# Patient Record
Sex: FEMALE | Race: WHITE | URBAN - METROPOLITAN AREA
[De-identification: names, ages, dates, MRNs, and addresses within clinical notes are randomized per-mention and may not be internally consistent; named-entity substitution may affect disease eponyms.]

---

## 2018-10-12 ENCOUNTER — OFFICE VISIT (OUTPATIENT)
Dept: INTERNAL MEDICINE CLINIC | Age: 23
End: 2018-10-12

## 2018-10-12 ENCOUNTER — DOCUMENTATION ONLY (OUTPATIENT)
Dept: INTERNAL MEDICINE CLINIC | Age: 23
End: 2018-10-12

## 2018-10-12 VITALS
WEIGHT: 193 LBS | SYSTOLIC BLOOD PRESSURE: 128 MMHG | HEIGHT: 64 IN | OXYGEN SATURATION: 98 % | TEMPERATURE: 99.1 F | HEART RATE: 94 BPM | RESPIRATION RATE: 22 BRPM | DIASTOLIC BLOOD PRESSURE: 73 MMHG | BODY MASS INDEX: 32.95 KG/M2

## 2018-10-12 DIAGNOSIS — F98.8 ATTENTION DEFICIT DISORDER, UNSPECIFIED HYPERACTIVITY PRESENCE: ICD-10-CM

## 2018-10-12 DIAGNOSIS — F41.9 ANXIETY: ICD-10-CM

## 2018-10-12 DIAGNOSIS — Z23 ENCOUNTER FOR IMMUNIZATION: ICD-10-CM

## 2018-10-12 RX ORDER — ESCITALOPRAM OXALATE 20 MG/1
30 TABLET ORAL DAILY
Refills: 2 | COMMUNITY
Start: 2018-09-28 | End: 2019-12-02 | Stop reason: SDUPTHER

## 2018-10-12 RX ORDER — NORETHINDRONE ACETATE AND ETHINYL ESTRADIOL AND FERROUS FUMARATE 1MG-20(24)
KIT ORAL
Refills: 0 | COMMUNITY
Start: 2018-08-13 | End: 2018-11-16 | Stop reason: SDUPTHER

## 2018-10-12 RX ORDER — ALPRAZOLAM 0.5 MG/1
TABLET ORAL
Refills: 1 | COMMUNITY
Start: 2018-08-10

## 2018-10-12 RX ORDER — DEXTROAMPHETAMINE SULFATE, DEXTROAMPHETAMINE SACCHARATE, AMPHETAMINE SULFATE AND AMPHETAMINE ASPARTATE 5; 5; 5; 5 MG/1; MG/1; MG/1; MG/1
CAPSULE, EXTENDED RELEASE ORAL
Refills: 0 | COMMUNITY
Start: 2018-10-06 | End: 2019-12-02 | Stop reason: SDUPTHER

## 2018-10-12 NOTE — PROGRESS NOTES
ROOM # 2 Identified pt with two pt identifiers(name and ). Reviewed record in preparation for visit and have obtained necessary documentation. Chief Complaint Patient presents with  New Patient Trung Vargas preferred language for health care discussion is english/other. Is the patient using any DME equipment during OV? NO Trung Vargas is due for: 
Health Maintenance Due Topic  HPV Age 9Y-34Y (1 of 3 - Female 3 Dose Series)  DTaP/Tdap/Td series (1 - Tdap)  PAP AKA CERVICAL CYTOLOGY  Influenza Age 5 to Adult Health Maintenance reviewed and discussed per provider Please order/place referral if appropriate. Advance Directive: 1. Do you have an advance directive in place? Patient Reply: NO 
 
2. If not, would you like material regarding how to put one in place? NO Coordination of Care: 1. Have you been to the ER, urgent care clinic since your last visit? Hospitalized since your last visit? NO 
 
2. Have you seen or consulted any other health care providers outside of the [address] [place] [address] since your last visit? Include any pap smears or colon screening. NO Patient is accompanied by self I have received verbal consent from Trung Vargas to discuss any/all medical information while they are present in the room. Learning Assessment: 
No flowsheet data found. Depression Screening: PHQ over the last two weeks 10/12/2018 Little interest or pleasure in doing things Not at all Feeling down, depressed, irritable, or hopeless Not at all Total Score PHQ 2 0 Abuse Screening: No flowsheet data found. Fall Risk No flowsheet data found.

## 2018-10-12 NOTE — MR AVS SNAPSHOT
[facility] 
 
 
 [address] [facility] [address] [address] 
[phone] Patient: Anette Stewart MRN: [id number] :1995 Visit Information Date & Time Provider Department Dept. Phone Encounter #  
 10/12/2018  9:30 AM [address] [phone] [id number] Follow-up Instructions Return if symptoms worsen or fail to improve. Upcoming Health Maintenance Date Due  
 HPV Age 9Y-34Y (1 of 3 - Female 3 Dose Series) 2008 DTaP/Tdap/Td series (1 - Tdap) 2018 PAP AKA CERVICAL CYTOLOGY 2018 Influenza Age 5 to Adult 2018 Allergies as of 10/12/2018  Review Complete On: 10/12/2018 By: Elroy Quintanilla MD  
  
 Severity Noted Reaction Type Reactions Erythromycin  10/12/2018    Swelling Current Immunizations  Never Reviewed No immunizations on file. Not reviewed this visit You Were Diagnosed With   
  
 Codes Comments BMI 33.0-33.9,adult    -  Primary ICD-10-CM: L33.18 
ICD-9-CM: V85.33 Anxiety     ICD-10-CM: F41.9 ICD-9-CM: 300.00 Attention deficit disorder, unspecified hyperactivity presence     ICD-10-CM: F98.8 ICD-9-CM: 314.00 Vitals BP Pulse Temp Resp Height(growth percentile) Weight(growth percentile) 128/73 (BP 1 Location: Right arm, BP Patient Position: Sitting) 94 99.1 °F (37.3 °C) (Oral) 22 5' 4\" (1.626 m) 193 lb (87.5 kg) LMP SpO2 BMI OB Status Smoking Status 10/07/2018 98% 33.13 kg/m2 Having regular periods Never Smoker BMI and BSA Data Body Mass Index Body Surface Area  
 33.13 kg/m 2 1.99 m 2 Preferred Pharmacy Pharmacy Name Phone [id number], [address] [place] [address] [phone] Your Updated Medication List  
  
   
This list is accurate as of 10/12/18 10:12 AM.  Always use your most recent med list.  
  
  
  
  
 ADDERALL XR 15 mg XR capsule Generic drug:  amphetamine-dextroamphetamine XR  
TAKE ONE CAPSULE BY MOUTH EVERY MORNING  
  
 ALPRAZolam 0.5 mg tablet Commonly known as:  XANAX  
TALE 1 TABLET BY MOUTH EVERY DAY  
  
 escitalopram oxalate 20 mg tablet Commonly known as:  Madan Boast TAKE 1 TABLET BY MOUTH EVERY DAY IN THE MORNING  
  
 MAGNESIUM PO Take  by mouth. MELODETTA 24 FE 1 mg-20 mcg(24) /75 mg (4) Chew Generic drug:  norethindrone-e.estradiol-iron TAKE 1 TABLET BY MOUTH EVERY DAY We Performed the Following REFERRAL TO WEIGHT LOSS [DHZ132 Custom] Comments:  
 Please evaluate pt with difficulty with weight loss despite dietary changes Follow-up Instructions Return if symptoms worsen or fail to improve. Referral Information Referral ID Referred By Referred To  
  
 [id number] [address] [place] [address] [address] Phone: [phone] Fax: [phone] Visits Status Start Date End Date 1 New Request 10/12/18 10/12/19 If your referral has a status of pending review or denied, additional information will be sent to support the outcome of this decision. Patient Instructions A referral has been entered to weight management. You can consider looking into an online program such as Lean Body Coaching. Try to keep food choices to 20% of calories or less from fat (grams of fat x 9, divide by total calories) Choose foods the way they are found in nature rather than processed. Body Mass Index: Care Instructions Your Care Instructions Body mass index (BMI) can help you see if your weight is raising your risk for health problems. It uses a formula to compare how much you weigh with how tall you are. · A BMI lower than 18.5 is considered underweight. · A BMI between 18.5 and 24.9 is considered healthy. · A BMI between 25 and 29.9 is considered overweight.  A BMI of 30 or higher is considered obese. If your BMI is in the normal range, it means that you have a lower risk for weight-related health problems. If your BMI is in the overweight or obese range, you may be at increased risk for weight-related health problems, such as high blood pressure, heart disease, stroke, arthritis or joint pain, and diabetes. If your BMI is in the underweight range, you may be at increased risk for health problems such as fatigue, lower protection (immunity) against illness, muscle loss, bone loss, hair loss, and hormone problems. BMI is just one measure of your risk for weight-related health problems. You may be at higher risk for health problems if you are not active, you eat an unhealthy diet, or you drink too much alcohol or use tobacco products. Follow-up care is a key part of your treatment and safety. Be sure to make and go to all appointments, and call your doctor if you are having problems. It's also a good idea to know your test results and keep a list of the medicines you take. How can you care for yourself at home? · Practice healthy eating habits. This includes eating plenty of fruits, vegetables, whole grains, lean protein, and low-fat dairy. · If your doctor recommends it, get more exercise. Walking is a good choice. Bit by bit, increase the amount you walk every day. Try for at least 30 minutes on most days of the week. · Do not smoke. Smoking can increase your risk for health problems. If you need help quitting, talk to your doctor about stop-smoking programs and medicines. These can increase your chances of quitting for good. · Limit alcohol to 2 drinks a day for men and 1 drink a day for women. Too much alcohol can cause health problems. If you have a BMI higher than 25 · Your doctor may do other tests to check your risk for weight-related health problems.  This may include measuring the distance around your waist. A waist measurement of more than 40 inches in men or 35 inches in women can increase the risk of weight-related health problems. · Talk with your doctor about steps you can take to stay healthy or improve your health. You may need to make lifestyle changes to lose weight and stay healthy, such as changing your diet and getting regular exercise. If you have a BMI lower than 18.5 · Your doctor may do other tests to check your risk for health problems. · Talk with your doctor about steps you can take to stay healthy or improve your health. You may need to make lifestyle changes to gain or maintain weight and stay healthy, such as getting more healthy foods in your diet and doing exercises to build muscle. Where can you learn more? Go to http://jeanine-mimi.info/. Enter S176 in the search box to learn more about \"Body Mass Index: Care Instructions. \" Current as of: June 26, 2018 Content Version: 11.8 © 9025-2009 Broadcast[employer]. Care instructions adapted under license by [employer] (which disclaims liability or warranty for this information). If you have questions about a medical condition or this instruction, always ask your healthcare professional. [place] [address] any warranty or liability for your use of this information. Introducing [facility] & HE[facility]! [employer] introduces iAgree patient portal. Now you can access parts of your medical record, email your doctor's office, and request medication refills online. 1. In your internet browser, go to [email or web address] 2. Click on the First Time User? Click Here link in the Sign In box. You will see the New Member Sign Up page. 3. Enter your iAgree Access Code exactly as it appears below. You will not need to use this code after youve completed the sign-up process. If you do not sign up before the expiration date, you must request a new code. · XIPWIRE Access Code: [email or web address] Expires: 1/10/2019 10:12 AM 
 
4. Enter the last four digits of your Social Security Number (xxxx) and Date of Birth (mm/dd/yyyy) as indicated and click Submit. You will be taken to the next sign-up page. 5. Create a XIPWIRE ID. This will be your XIPWIRE login ID and cannot be changed, so think of one that is secure and easy to remember. 6. Create a XIPWIRE password. You can change your password at any time. 7. Enter your Password Reset Question and Answer. This can be used at a later time if you forget your password. 8. Enter your e-mail address. You will receive e-mail notification when new information is available in [address] 9. Click Sign Up. You can now view and download portions of your medical record. 10. Click the Download Summary menu link to download a portable copy of your medical information. If you have questions, please visit the Frequently Asked Questions section of the XIPWIRE website. Remember, XIPWIRE is NOT to be used for urgent needs. For medical emergencies, dial 911. Now available from your iPhone and Android! Please provide this summary of care documentation to your next provider. If you have any questions after today's visit, please call [phone].

## 2018-10-12 NOTE — PROGRESS NOTES
Flu vaccine first dose administered in Lt deltoid with patient's consent. Patient observed for 10 minutes, no reaction noted at present time. Patient tolerated procedure well and left without any complaints. Patient received flu VIS sheet and verbalized understanding.

## 2018-10-12 NOTE — PROGRESS NOTES
HISTORY OF PRESENT ILLNESS Allen Jacobsen is a 26 y.o. female. HPI Comments: 25 yo female here to establish care, evaluation of weight. She reports she has difficulty with weight loss despite healthy diet, exercise. Reports she was a 'pudgy\" child; lost weight around age 17 then steadily regained. Notes the only times she has lost weight is when she has stopped eating. She feels she could have potential for eating disorder such as bulimia but she is unable to make herself vomit. Has been evaluated with labs in past (2016). She feels she has low metabolism. She is followed by psychiatrist and psychologist regularly. Treated for depression, anxiety, ADD. Lost some weight when first started on Adderall. Gained weight when she was on Abilify. 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 Graduated from [employer] with degree in Biology. Hopes to go back to school for master's eventually. She is due for pap. Has schedule appts with gyn for this but has not had done yet due to anxiety. Review of Systems Constitutional: Negative for chills, fever and weight loss. HENT: Negative for congestion and ear pain. Eyes: Negative for blurred vision and pain. Respiratory: Negative for cough and shortness of breath. Cardiovascular: Negative for chest pain, palpitations and leg swelling. Gastrointestinal: Negative for nausea and vomiting. Genitourinary: Negative for frequency and urgency. Musculoskeletal: Negative for joint pain and myalgias. Skin: Negative for itching and rash. Neurological: Negative for dizziness, tingling and headaches. Psychiatric/Behavioral: Positive for depression. The patient is nervous/anxious. Past Medical History:  
Diagnosis Date  ADD (attention deficit disorder)  Depression Past Surgical History:  
Procedure Laterality Date  HX HEENT No current outpatient prescriptions on file prior to visit. No current facility-administered medications on file prior to visit. Allergies Allergen Reactions  Erythromycin Swelling Family History Problem Relation Age of Onset  Cancer Mother  Heart Disease Father  Psychiatric Disorder Father  Psychiatric Disorder Brother Social History Social History  Marital status: SINGLE Spouse name: N/A  
 Number of children: N/A  
 Years of education: N/A Occupational History  Not on file. Social History Main Topics  Smoking status: Never Smoker  Smokeless tobacco: Never Used  Alcohol use Yes Comment: socially/weekends  Drug use: No  
 Sexual activity: Yes  
  Partners: Male Birth control/ protection: Pill Other Topics Concern  Not on file Social History Narrative  No narrative on file Physical Exam  
Constitutional: She appears well-developed and well-nourished. No distress. /73 (BP 1 Location: Right arm, BP Patient Position: Sitting)  Pulse 94  Temp 99.1 °F (37.3 °C) (Oral)   Resp 22  Ht 5' 4\" (1.626 m)  Wt 193 lb (87.5 kg)  LMP 10/07/2018  SpO2 98%  BMI 33.13 kg/m2 HENT:  
Right Ear: Tympanic membrane and ear canal normal.  
Left Ear: Tympanic membrane and ear canal normal.  
Mouth/Throat: No oropharyngeal exudate or posterior oropharyngeal erythema. Eyes: EOM are normal. Right eye exhibits no discharge. Left eye exhibits no discharge. No scleral icterus. Neck: Neck supple. Cardiovascular: Normal rate, regular rhythm and normal heart sounds. Exam reveals no gallop and no friction rub. No murmur heard. Pulmonary/Chest: Effort normal and breath sounds normal. No respiratory distress. She has no wheezes. She has no rales. Abdominal: Soft. She exhibits no distension. There is no tenderness. There is no rebound. Musculoskeletal: She exhibits no edema or tenderness. Lymphadenopathy:  
  She has no cervical adenopathy. Neurological: She is alert. She exhibits normal muscle tone. Skin: Skin is warm and dry. Psychiatric: Her speech is normal and behavior is normal.  
 
No results found for: TSH, TSHEXT, TSH2, TSH3, TSHP, TSHELE, TT3, T3U, T3UP, FRT3, FT3, T3T, FT4, FT4P, T4, T4P, FT4T, TT7, V0LTBJFTK, TSHEXT [facility] labs 6/14/16: TSH 1.91, T4 1.1, T3 KRISTIN 154;Chol 162, , HDL 52, LDL 79; A1c 5.1%, creatinine 0.7; hgb 13.6, hct 41.1 ASSESSMENT and PLAN 
  ICD-10-CM ICD-9-CM 1. BMI 33.0-33.9,adult Z68.33 V85.33 REFERRAL TO WEIGHT LOSS 2. Anxiety F41.9 300.00   
3. Attention deficit disorder, unspecified hyperactivity presence F98.8 314.00   
4. Encounter for immunization Z23 V03.89 INFLUENZA VIRUS VAC QUAD,SPLIT,PRESV FREE SYRINGE IM Old records requested Discussed diet which she feels is already healthy. Referral entered to weight loss clinic. Continue with exercise. Continue f/u with mental health for treatment of ADD, depression/anxiety. Flu shot today.

## 2018-10-12 NOTE — PATIENT INSTRUCTIONS
A referral has been entered to weight management. You can consider looking into an online program such as Lean Body Coaching. Try to keep food choices to 20% of calories or less from fat (grams of fat x 9, divide by total calories) Choose foods the way they are found in nature rather than processed. Body Mass Index: Care Instructions Your Care Instructions Body mass index (BMI) can help you see if your weight is raising your risk for health problems. It uses a formula to compare how much you weigh with how tall you are. · A BMI lower than 18.5 is considered underweight. · A BMI between 18.5 and 24.9 is considered healthy. · A BMI between 25 and 29.9 is considered overweight. A BMI of 30 or higher is considered obese. If your BMI is in the normal range, it means that you have a lower risk for weight-related health problems. If your BMI is in the overweight or obese range, you may be at increased risk for weight-related health problems, such as high blood pressure, heart disease, stroke, arthritis or joint pain, and diabetes. If your BMI is in the underweight range, you may be at increased risk for health problems such as fatigue, lower protection (immunity) against illness, muscle loss, bone loss, hair loss, and hormone problems. BMI is just one measure of your risk for weight-related health problems. You may be at higher risk for health problems if you are not active, you eat an unhealthy diet, or you drink too much alcohol or use tobacco products. Follow-up care is a key part of your treatment and safety. Be sure to make and go to all appointments, and call your doctor if you are having problems. It's also a good idea to know your test results and keep a list of the medicines you take. How can you care for yourself at home? · Practice healthy eating habits. This includes eating plenty of fruits, vegetables, whole grains, lean protein, and low-fat dairy. · If your doctor recommends it, get more exercise. Walking is a good choice. Bit by bit, increase the amount you walk every day. Try for at least 30 minutes on most days of the week. · Do not smoke. Smoking can increase your risk for health problems. If you need help quitting, talk to your doctor about stop-smoking programs and medicines. These can increase your chances of quitting for good. · Limit alcohol to 2 drinks a day for men and 1 drink a day for women. Too much alcohol can cause health problems. If you have a BMI higher than 25 · Your doctor may do other tests to check your risk for weight-related health problems. This may include measuring the distance around your waist. A waist measurement of more than 40 inches in men or 35 inches in women can increase the risk of weight-related health problems. · Talk with your doctor about steps you can take to stay healthy or improve your health. You may need to make lifestyle changes to lose weight and stay healthy, such as changing your diet and getting regular exercise. If you have a BMI lower than 18.5 · Your doctor may do other tests to check your risk for health problems. · Talk with your doctor about steps you can take to stay healthy or improve your health. You may need to make lifestyle changes to gain or maintain weight and stay healthy, such as getting more healthy foods in your diet and doing exercises to build muscle. Where can you learn more? Go to http://jeanine-mimi.info/. Enter S176 in the search box to learn more about \"Body Mass Index: Care Instructions. \" Current as of: June 26, 2018 Content Version: 11.8 © 2618-4006 Healthwise, Inc[employer]. Care instructions adapted under license by [employer] (which disclaims liability or warranty for this information).  If you have questions about a medical condition or this instruction, always ask your healthcare professional. [employer] disclaims any warranty or liability for your use of this information.

## 2018-11-16 ENCOUNTER — OFFICE VISIT (OUTPATIENT)
Dept: INTERNAL MEDICINE CLINIC | Age: 23
End: 2018-11-16

## 2018-11-16 VITALS
DIASTOLIC BLOOD PRESSURE: 76 MMHG | BODY MASS INDEX: 32.1 KG/M2 | TEMPERATURE: 98.8 F | WEIGHT: 188 LBS | HEIGHT: 64 IN | HEART RATE: 102 BPM | OXYGEN SATURATION: 98 % | SYSTOLIC BLOOD PRESSURE: 127 MMHG | RESPIRATION RATE: 16 BRPM

## 2018-11-16 DIAGNOSIS — N92.6 IRREGULAR MENSTRUAL CYCLE: ICD-10-CM

## 2018-11-16 DIAGNOSIS — F41.9 ANXIETY: Primary | ICD-10-CM

## 2018-11-16 RX ORDER — PROMETHAZINE HYDROCHLORIDE 25 MG/1
25 TABLET ORAL
COMMUNITY
Start: 2017-12-28 | End: 2019-06-26

## 2018-11-16 RX ORDER — NORETHINDRONE ACETATE AND ETHINYL ESTRADIOL AND FERROUS FUMARATE 1MG-20(24)
KIT ORAL
Qty: 3 DOSE PACK | Refills: 3 | Status: SHIPPED | OUTPATIENT
Start: 2018-11-16 | End: 2019-10-09 | Stop reason: SDUPTHER

## 2018-11-16 RX ORDER — HYDROCODONE BITARTRATE AND ACETAMINOPHEN 5; 300 MG/1; MG/1
TABLET ORAL
COMMUNITY
Start: 2017-12-28 | End: 2019-06-26

## 2018-11-16 NOTE — PROGRESS NOTES
ROOM # 17 Augusto Jiang presents today for Chief Complaint Patient presents with  Physical  
 Well Woman Augusto Jiang preferred language for health care discussion is english/other. Is someone accompanying this pt? Yes mother Is the patient using any DME equipment during OV? No 
 
Depression Screening: PHQ over the last two weeks 11/16/2018 10/12/2018 Little interest or pleasure in doing things Several days Not at all Feeling down, depressed, irritable, or hopeless Several days Not at all Total Score PHQ 2 2 0 Learning Assessment: 
Learning Assessment 11/16/2018 PRIMARY LEARNER Patient HIGHEST LEVEL OF EDUCATION - PRIMARY LEARNER  4 YEARS OF COLLEGE  
BARRIERS PRIMARY LEARNER NONE  
CO-LEARNER CAREGIVER No  
PRIMARY LANGUAGE ENGLISH  NEED No  
LEARNER PREFERENCE PRIMARY DEMONSTRATION  
LEARNING SPECIAL TOPICS no  
ANSWERED BY patient RELATIONSHIP SELF  
ASSESSMENT COMMENT none Abuse Screening: N/I Fall Risk N/I Health Maintenance reviewed and discussed per provider. Yes Augusto Jiang is due for Health Maintenance Due Topic Date Due  
 HPV Age 9Y-34Y (1 - Female 3-dose series) 06/17/2008  DTaP/Tdap/Td series (1 - Tdap) 06/17/2018  PAP AKA CERVICAL CYTOLOGY  06/17/2018 Please order/place referral if appropriate. Advance Directive: 1. Do you have an advance directive in place? Patient Reply: No 
 
2. If not, would you like material regarding how to put one in place? Patient Reply: No 
 
Coordination of Care: 1. Have you been to the ER, urgent care clinic since your last visit? Hospitalized since your last visit? No 
 
2. Have you seen or consulted any other health care providers outside of the [facility] since your last visit? Include any pap smears or colon screening. Yes Dr Silver Spencer

## 2018-11-16 NOTE — PATIENT INSTRUCTIONS
Please check with your insurance to see which gynecologists are in-network. Options: Dr. Segundo Smith, Dr. Colleen Dno, Dr. Zhang Sargent, Dr. Hernandez Comp Learning About Birth Control: The Implant What is the implant? The implant is used to prevent pregnancy. It's a thin myla about the size of a matchstick that is inserted under the skin (subdermal) on the inside of your arm. The implant releases the hormone progestin to prevent pregnancy. Progestin prevents pregnancy in these ways: It thickens the mucus in the cervix. This makes it hard for sperm to travel into the uterus. It also thins the lining of the uterus, which makes it harder for a fertilized egg to attach to the uterus. Progestin can sometimes stop the ovaries from releasing an egg each month (ovulation). The implant prevents pregnancy for 3 years. Once it is put in, you don't have to do anything else to prevent pregnancy. The implant can only be inserted and removed by your doctor or another trained health professional. These procedures can be done in your doctor's office and only take a few minutes. Your doctor numbs the area and \"injects\" the implant under your skin. No cuts are made in your skin. To remove the implant, your doctor numbs the area, makes a small cut in the skin, and pulls the implant out. How well does it work? The implant works very well. Fewer than 1 woman out of 100 has an unplanned pregnancy. Be sure to tell your doctor about any health problems you have or medicines you take. He or she can help you choose the birth control method that is right for you. What are the advantages of the implant? · The implant is one of the most effective methods of birth control. · It prevents pregnancy for up to 3 years. You don't have to worry about birth control for this time. · It's safe to use while breastfeeding. · The implant doesn't contain estrogen.  So you can use it if you don't want to take estrogen or can't take estrogen because you have certain health problems or concerns. · It may reduce heavy bleeding and cramping. · It's convenient. It is always providing birth control and cannot be seen. You don't need to remember to take a pill or get a shot. You don't have to interrupt sex to protect against pregnancy. What are the disadvantages of the implant? · The implant doesn't protect against sexually transmitted infections (STIs), such as herpes or HIV/AIDS. If you aren't sure if your sex partner might have an STI, use a condom to protect against infection. · It may cause irregular periods, or you may have spotting between periods. You may also stop getting a period. Some women see having no period as an advantage. · It may cause mood changes, less interest in sex, or weight gain. · You have to see a doctor to have an implant inserted and removed. Where can you learn more? Go to http://jeanine-mimi.info/. Enter F276 in the search box to learn more about \"Learning About Birth Control: The Implant. \" Current as of: November 21, 2017 Content Version: 11.8 © 0327-8619 [employer]. Care instructions adapted under license by [employer] (which disclaims liability or warranty for this information). If you have questions about a medical condition or this instruction, always ask your healthcare professional. [address] any warranty or liability for your use of this information.

## 2018-11-16 NOTE — PROGRESS NOTES
HISTORY OF PRESENT ILLNESS David Bae is a 26 y.o. female. 27 yo female here for pap but has significant anxiety/fear of this. Has not had pelvic exam in the past. Is sexually active. Has had two lifetime partners. Mother recalls that pt did receive HPV vaccine series. No pelvic complaints other than some discomfort at beginning of intercourse, otherwise pleasurable. She has been taking same OCP since age 17 which was started due to irregular menses, prolonged bleeding. Periods are regular on this. She is interested in alternatives as she sometimes forgets to take pills and does not desire pregnancy. Depo tried in past but had breakthrough bleeding. Interested in [id number] [place].. Has not schedule appt yet with weight loss clinic. Has lost 5 lbs in past month which she attributes to being busier at work. Physical  
Pertinent negatives include no chest pain, no headaches and no shortness of breath. Well Woman Pertinent negatives include no chest pain, no headaches and no shortness of breath. Review of Systems Constitutional: Negative for chills, fever and weight loss. HENT: Negative for congestion and ear pain. Eyes: Negative for blurred vision and pain. Respiratory: Negative for cough and shortness of breath. Cardiovascular: Negative for chest pain, palpitations and leg swelling. Gastrointestinal: Negative for nausea and vomiting. Genitourinary: Negative for frequency and urgency. Musculoskeletal: Negative for joint pain and myalgias. Skin: Negative for itching and rash. Neurological: Negative for dizziness, tingling and headaches. Psychiatric/Behavioral: Negative for depression. The patient is not nervous/anxious. Past Medical History:  
Diagnosis Date  ADD (attention deficit disorder)  Depression Current Outpatient Medications on File Prior to Visit Medication Sig Dispense Refill  promethazine (PHENERGAN) 25 mg tablet 25 mg.  HYDROcodone-acetaminophen (XODOL) 5-300 mg tablet Take 1-2 Tabs by Mouth Every 4 Hours As Needed for Pain.  ALPRAZolam (XANAX) 0.5 mg tablet TALE 1 TABLET BY MOUTH EVERY DAY  1  
 ADDERALL XR 15 mg XR capsule TAKE ONE CAPSULE BY MOUTH EVERY MORNING  0  
 escitalopram oxalate (LEXAPRO) 20 mg tablet TAKE 1 TABLET BY MOUTH EVERY DAY IN THE MORNING  2  
 MAGNESIUM PO Take  by mouth. No current facility-administered medications on file prior to visit. Physical Exam  
Constitutional: She appears well-developed and well-nourished. Pulmonary/Chest: Effort normal. No respiratory distress. Neurological: She is alert. Psychiatric: Her speech is normal. Thought content normal. Her mood appears anxious. Vitals reviewed. No results found for: NA, K, CL, CO2, AGAP, GLU, BUN, CREA, BUCR, GFRAA, GFRNA, CA, TBIL, TBILI, GPT, SGOT, AP, TP, ALB, GLOB, AGRAT, ALT  
 
ASSESSMENT and PLAN 
  ICD-10-CM ICD-9-CM 1. Anxiety F41.9 300.00   
2. Irregular menstrual cycle N92.6 626.4 3. BMI 32.0-32.9,adult Z68.32 V85.32 Discussed her anxiety/fear of pap at length. Despite taking xanax x 2 today, she does not feel ready for this. Will hold at this time. Discussed birth control options. She will check with her insurance for preferred gynecologist and referral can be entered. Continue with weight loss efforts. > 50% of 20 minute visit spent counseling.

## 2019-06-26 ENCOUNTER — OFFICE VISIT (OUTPATIENT)
Dept: INTERNAL MEDICINE CLINIC | Age: 24
End: 2019-06-26

## 2019-06-26 VITALS
OXYGEN SATURATION: 99 % | HEART RATE: 112 BPM | BODY MASS INDEX: 30.56 KG/M2 | TEMPERATURE: 99.6 F | WEIGHT: 179 LBS | RESPIRATION RATE: 18 BRPM | DIASTOLIC BLOOD PRESSURE: 79 MMHG | SYSTOLIC BLOOD PRESSURE: 149 MMHG | HEIGHT: 64 IN

## 2019-06-26 DIAGNOSIS — L92.3 TATTOO REACTION: Primary | ICD-10-CM

## 2019-06-26 RX ORDER — CEPHALEXIN 500 MG/1
500 CAPSULE ORAL 4 TIMES DAILY
COMMUNITY
End: 2019-12-02 | Stop reason: ALTCHOICE

## 2019-06-26 NOTE — PROGRESS NOTES
HISTORY OF PRESENT ILLNESS  Jose Eng is a 27 y.o. female. Here for f/u from urgent care visit 3 days ago due to concern with infection at new tattoo. Reports tattoo done Jun 17. Though healing well but started having drainage, irritation at site. Seen by urgent care and started on abx. Still feels raw. Some bleeding. No fevers, chills. Review of Systems   Constitutional: Negative for chills, fever and weight loss. HENT: Negative for congestion and ear pain. Eyes: Negative for blurred vision and pain. Respiratory: Negative for cough and shortness of breath. Cardiovascular: Negative for chest pain, palpitations and leg swelling. Gastrointestinal: Negative for nausea and vomiting. Genitourinary: Negative for frequency and urgency. Musculoskeletal: Negative for joint pain and myalgias. Skin: Negative for itching and rash. Neurological: Negative for dizziness, tingling and headaches. Psychiatric/Behavioral: Negative for depression. The patient is not nervous/anxious. Past Medical History:   Diagnosis Date    ADD (attention deficit disorder)     Depression      Current Outpatient Medications on File Prior to Visit   Medication Sig Dispense Refill    bupropion HCl (WELLBUTRIN PO) Take  by mouth.  cephALEXin (KEFLEX) 500 mg capsule Take 500 mg by mouth four (4) times daily.  MELODETTA 24 FE 1 mg-20 mcg(24) /75 mg (4) chew TAKE 1 TABLET BY MOUTH EVERY DAY 3 Dose Pack 3    ALPRAZolam (XANAX) 0.5 mg tablet TALE 1 TABLET BY MOUTH EVERY DAY  1    ADDERALL XR 15 mg XR capsule TAKE ONE CAPSULE BY MOUTH EVERY MORNING  0    escitalopram oxalate (LEXAPRO) 20 mg tablet TAKE 1 TABLET BY MOUTH EVERY DAY IN THE MORNING  2    MAGNESIUM PO Take  by mouth. No current facility-administered medications on file prior to visit. Social History     Tobacco Use    Smoking status: Never Smoker    Smokeless tobacco: Never Used   Substance Use Topics    Alcohol use:  Yes Comment: socially/weekends    Drug use: No     Physical Exam   Constitutional: She appears well-developed and well-nourished. No distress. Pulmonary/Chest: Effort normal. No respiratory distress. Skin: Skin is warm and dry. No erythema. New tattoo upper back. No surrounding erythema. Mild irritation R lower portion. No drainage. Nursing note and vitals reviewed. No results found for: NA, K, CL, CO2, AGAP, GLU, BUN, CREA, BUCR, GFRAA, GFRNA, CA, TBIL, TBILI, GPT, SGOT, AP, TP, ALB, GLOB, AGRAT, ALT  ASSESSMENT and PLAN    ICD-10-CM ICD-9-CM    1. Tattoo reaction L92.3 709.4      No evidence of infection at this time. Discussed using nonstick pad at night.  ? Reaction to red dye  Will monitor and return if worsening sx

## 2019-06-26 NOTE — PROGRESS NOTES
Rm: 18    Chief Complaint   Patient presents with   [address]     pt was seen in urgent care on Mon 6/24     Depression Screening:  3 most recent PHQ Screens 6/26/2019 11/16/2018 10/12/2018   Little interest or pleasure in doing things Not at all Several days Not at all   Feeling down, depressed, irritable, or hopeless Not at all Several days Not at all   Total Score PHQ 2 0 2 0       Learning Assessment:  Learning Assessment 11/16/2018   PRIMARY LEARNER Patient   HIGHEST LEVEL OF EDUCATION - PRIMARY LEARNER  4 YEARS OF COLLEGE   BARRIERS PRIMARY LEARNER NONE   CO-LEARNER CAREGIVER No   PRIMARY LANGUAGE ENGLISH    NEED No   LEARNER PREFERENCE PRIMARY DEMONSTRATION   LEARNING SPECIAL TOPICS no   ANSWERED BY patient   RELATIONSHIP SELF   ASSESSMENT COMMENT none       Abuse Screening:  No flowsheet data found. Health Maintenance reviewed and discussed per provider: yes     Coordination of Care:    1. Have you been to the ER, urgent care clinic since your last visit? Hospitalized since your last visit? no    2. Have you seen or consulted any other health care providers outside of the [address] [place] [address] since your last visit? Include any pap smears or colon screening.  no

## 2019-10-09 RX ORDER — NORETHINDRONE ACETATE AND ETHINYL ESTRADIOL AND FERROUS FUMARATE 1MG-20(24)
KIT ORAL
Qty: 3 PACKAGE | Refills: 3 | Status: SHIPPED | OUTPATIENT
Start: 2019-10-09 | End: 2020-08-31

## 2019-12-02 ENCOUNTER — OFFICE VISIT (OUTPATIENT)
Dept: INTERNAL MEDICINE CLINIC | Age: 24
End: 2019-12-02

## 2019-12-02 VITALS
HEIGHT: 64 IN | BODY MASS INDEX: 31.24 KG/M2 | HEART RATE: 101 BPM | RESPIRATION RATE: 18 BRPM | TEMPERATURE: 99.6 F | SYSTOLIC BLOOD PRESSURE: 129 MMHG | WEIGHT: 183 LBS | DIASTOLIC BLOOD PRESSURE: 81 MMHG | OXYGEN SATURATION: 96 %

## 2019-12-02 DIAGNOSIS — F41.9 ANXIETY: ICD-10-CM

## 2019-12-02 DIAGNOSIS — F90.0 ADHD (ATTENTION DEFICIT HYPERACTIVITY DISORDER), INATTENTIVE TYPE: Primary | ICD-10-CM

## 2019-12-02 DIAGNOSIS — I95.1 ORTHOSTASIS: ICD-10-CM

## 2019-12-02 DIAGNOSIS — F34.1 PERSISTENT DEPRESSIVE DISORDER: ICD-10-CM

## 2019-12-02 RX ORDER — DEXTROAMPHETAMINE SULFATE, DEXTROAMPHETAMINE SACCHARATE, AMPHETAMINE SULFATE AND AMPHETAMINE ASPARTATE 5; 5; 5; 5 MG/1; MG/1; MG/1; MG/1
20 CAPSULE, EXTENDED RELEASE ORAL DAILY
Qty: 30 CAP | Refills: 0 | Status: SHIPPED | OUTPATIENT
Start: 2019-12-02 | End: 2019-12-16 | Stop reason: SDUPTHER

## 2019-12-02 RX ORDER — ESCITALOPRAM OXALATE 20 MG/1
30 TABLET ORAL DAILY
Qty: 135 TAB | Refills: 1 | Status: SHIPPED | OUTPATIENT
Start: 2019-12-02 | End: 2020-07-20

## 2019-12-02 RX ORDER — DEXTROAMPHETAMINE SULFATE, DEXTROAMPHETAMINE SACCHARATE, AMPHETAMINE SULFATE AND AMPHETAMINE ASPARTATE 3.75; 3.75; 3.75; 3.75 MG/1; MG/1; MG/1; MG/1
CAPSULE, EXTENDED RELEASE ORAL
Refills: 0 | Status: CANCELLED | OUTPATIENT
Start: 2019-12-02

## 2019-12-02 RX ORDER — ESCITALOPRAM OXALATE 20 MG/1
TABLET ORAL
Refills: 2 | Status: CANCELLED | OUTPATIENT
Start: 2019-12-02

## 2019-12-02 NOTE — PROGRESS NOTES
HISTORY OF PRESENT ILLNESS  Reema Sharma is a 27 y.o. female. HPI  Ms. Leah Carmichael presents to care regarding her ADHD (inattentive type, moderate), persistent depressive disorder, and anxiety. Her previous clinic [facility] has closed, and she is soon to run out of medication. Records brought to appt for review. - She continues to follow with [facility], every 2-3 weeks. - Recently, Wellbutrin was discontinued, and Lexapro was increased to 30 mg daily. She is taking 30 mg qod and 20 mg qod. She is also taking Adderall XR 20 mg daily and is doing well with this. -  reviewed, no aberrancies. Current Outpatient Medications   Medication Sig Dispense Refill    norethindrone-e.estradiol-iron 1 mg-20 mcg(24) /75 mg (4) chew TAKE 1 TABLET BY MOUTH EVERY DAY 3 Package 3    ALPRAZolam (XANAX) 0.5 mg tablet TALE 1 TABLET BY MOUTH EVERY DAY  1    ADDERALL XR 20 mg XR capsule   0    escitalopram oxalate (LEXAPRO) 20 mg tablet Take 30 mg by mouth daily. 2    MAGNESIUM PO Take  by mouth. 2) C/o dizziness upon standing as of late. This does not occur every time she stands but occurs often when rising from a laying or sitting position. Hx of anemia, but cycles have been light since on her current OCP. Symptoms last a few sec then resolve. Review of Systems   Cardiovascular: Negative for palpitations. Psychiatric/Behavioral: Positive for depression. The patient is not nervous/anxious and does not have insomnia. Visit Vitals  /81 (BP 1 Location: Right arm, BP Patient Position: Sitting)   Pulse (!) 101   Temp 99.6 °F (37.6 °C) (Oral)   Resp 18   Ht 5' 4\" (1.626 m)   Wt 183 lb (83 kg)   LMP 11/18/2019   SpO2 96%   BMI 31.41 kg/m²     Wt Readings from Last 3 Encounters:   12/02/19 183 lb (83 kg)   06/26/19 179 lb (81.2 kg)   11/16/18 188 lb (85.3 kg)       Physical Exam  Constitutional:       General: She is not in acute distress.      Appearance: Normal appearance. She is well-developed. HENT:      Head: Normocephalic and atraumatic. Right Ear: Tympanic membrane, ear canal and external ear normal.      Left Ear: Tympanic membrane, ear canal and external ear normal.      Nose: Nose normal.      Mouth/Throat:      Mouth: Mucous membranes are moist.      Pharynx: Uvula midline. No oropharyngeal exudate or posterior oropharyngeal erythema. Tonsils: No tonsillar abscesses. Eyes:      General: No scleral icterus. Conjunctiva/sclera: Conjunctivae normal.      Pupils: Pupils are equal, round, and reactive to light. Neck:      Musculoskeletal: Neck supple. Cardiovascular:      Rate and Rhythm: Normal rate and regular rhythm. Pulses:           Dorsalis pedis pulses are 2+ on the right side and 2+ on the left side. Posterior tibial pulses are 2+ on the right side and 2+ on the left side. Heart sounds: Normal heart sounds. No murmur. No gallop. Comments: No pedal edema. Pulmonary:      Effort: Pulmonary effort is normal. No respiratory distress. Breath sounds: Normal breath sounds. No decreased breath sounds, wheezing, rhonchi or rales. Lymphadenopathy:      Head:      Right side of head: No submandibular or tonsillar adenopathy. Left side of head: No submandibular or tonsillar adenopathy. Cervical: No cervical adenopathy. Upper Body:      Right upper body: No supraclavicular adenopathy. Left upper body: No supraclavicular adenopathy. Skin:     General: Skin is warm and dry. Neurological:      Mental Status: She is alert and oriented to person, place, and time. Psychiatric:         Mood and Affect: Mood normal.         Speech: Speech normal.         Behavior: Behavior normal.         Thought Content: Thought content normal.         Judgment: Judgment normal.         ASSESSMENT and PLAN  Diagnoses and all orders for this visit:    1.  ADHD (attention deficit hyperactivity disorder), inattentive type  -     ADDERALL XR 20 mg XR capsule; Take 1 Cap by mouth daily. Max Daily Amount: 20 mg.   - Will bridge patient until she is able to establish with new psychiatrist. She plans to establish with the nurse practitioner that had previously been located at her former psychiatry office. Advised patient to go ahead and schedule this. - Psychiatry records reviewed. See scanned. 2. Persistent depressive disorder  3. Anxiety  -     escitalopram oxalate (LEXAPRO) 20 mg tablet; Take 1.5 Tabs by mouth daily for 90 days.   - Advised to take the 30 mg dosage consistently until she follows up with psychiatry again.        - Continue routine counseling. 4. Orthostasis  -     CBC W/O DIFF; Future  -     FERRITIN; Future  - Work to increase water consumption. Advised to rise slowly from seated and laying positions.        Follow-up and Dispositions    · Return in about 3 months (around 3/2/2020) for follow-up ADHD.     (cancel if established with new psychiatrist)

## 2019-12-02 NOTE — PROGRESS NOTES
Rm: 11    Chief Complaint   Patient presents with    [facility]    Medication Refill     Depression Screening:  3 most recent PHQ Screens 12/2/2019 6/26/2019 11/16/2018 10/12/2018   Little interest or pleasure in doing things Not at all Not at all Several days Not at all   Feeling down, depressed, irritable, or hopeless Not at all Not at all Several days Not at all   Total Score PHQ 2 0 0 2 0       Learning Assessment:  Learning Assessment 11/16/2018   PRIMARY LEARNER Patient   HIGHEST LEVEL OF EDUCATION - PRIMARY LEARNER  4 YEARS OF COLLEGE   BARRIERS PRIMARY LEARNER NONE   CO-LEARNER CAREGIVER No   PRIMARY LANGUAGE ENGLISH    NEED No   LEARNER PREFERENCE PRIMARY DEMONSTRATION   LEARNING SPECIAL TOPICS no   ANSWERED BY patient   RELATIONSHIP SELF   ASSESSMENT COMMENT none       Abuse Screening:  No flowsheet data found. Health Maintenance reviewed and discussed per provider: yes     Coordination of Care:    1. Have you been to the ER, urgent care clinic since your last visit? Hospitalized since your last visit? no    2. Have you seen or consulted any other health care providers outside of the [address] [place] [address] since your last visit? Include any pap smears or colon screening.  no

## 2019-12-03 LAB
ERYTHROCYTE [DISTWIDTH] IN BLOOD BY AUTOMATED COUNT: 12.7 % (ref 10–15.5)
FERRITIN SERPL-MCNC: 91 NG/ML (ref 10–291)
HCT VFR BLD AUTO: 41.8 % (ref 35.1–46.5)
HGB BLD-MCNC: 13.3 G/DL (ref 11.7–15.5)
MCH RBC QN AUTO: 30 PG (ref 26–34)
MCHC RBC AUTO-ENTMCNC: 32 G/DL (ref 31–36)
MCV RBC AUTO: 94 FL (ref 81–99)
PLATELET # BLD AUTO: 261 K/UL (ref 140–440)
PMV BLD AUTO: 11.3 FL (ref 9–13)
RBC # BLD AUTO: 4.47 M/UL (ref 3.8–5.2)
WBC # BLD AUTO: 6.7 K/UL (ref 4–11)

## 2019-12-10 ENCOUNTER — LAB ONLY (OUTPATIENT)
Dept: INTERNAL MEDICINE CLINIC | Age: 24
End: 2019-12-10

## 2019-12-10 DIAGNOSIS — Z23 ENCOUNTER FOR IMMUNIZATION: ICD-10-CM

## 2019-12-10 DIAGNOSIS — Z11.1 SCREENING EXAMINATION FOR PULMONARY TUBERCULOSIS: ICD-10-CM

## 2019-12-10 LAB
MM INDURATION POC: 0 MM (ref 0–5)
PPD POC: NEGATIVE NEGATIVE

## 2019-12-10 NOTE — PROGRESS NOTES
PPD Placement note  Sandra Delaney, 27 y.o. female is here today for placement of PPD test  Reason for PPD test: employment  Pt taken PPD test before: yes  Verified in allergy area and with patient that they are not allergic to the products PPD is made of (Phenol or Tween). Yes  Is patient taking any oral or IV steroid medication now or have they taken it in the last month? no  Has the patient ever received the BCG vaccine?: no  Has the patient been in recent contact with anyone known or suspected of having active TB disease?: no       Date of exposure (if applicable): n/a       Name of person they were exposed to (if applicable): n/a  Patient's Country of origin?: [place]  O: Alert and oriented in NAD. P:  PPD placed on 12/10/2019. Patient advised to return for reading within 48-72 hours.

## 2019-12-16 ENCOUNTER — DOCUMENTATION ONLY (OUTPATIENT)
Dept: INTERNAL MEDICINE CLINIC | Age: 24
End: 2019-12-16

## 2019-12-16 DIAGNOSIS — F90.0 ADHD (ATTENTION DEFICIT HYPERACTIVITY DISORDER), INATTENTIVE TYPE: ICD-10-CM

## 2019-12-16 RX ORDER — DEXTROAMPHETAMINE SULFATE, DEXTROAMPHETAMINE SACCHARATE, AMPHETAMINE SULFATE AND AMPHETAMINE ASPARTATE 5; 5; 5; 5 MG/1; MG/1; MG/1; MG/1
20 CAPSULE, EXTENDED RELEASE ORAL DAILY
Qty: 30 CAP | Refills: 0 | Status: SHIPPED | OUTPATIENT
Start: 2019-12-16 | End: 2020-01-29 | Stop reason: SDUPTHER

## 2019-12-16 NOTE — TELEPHONE ENCOUNTER
Requested Prescriptions     Pending Prescriptions Disp Refills    ADDERALL XR 20 mg XR capsule 30 Cap 0     Sig: Take 1 Cap by mouth daily. Max Daily Amount: 20 mg.

## 2019-12-16 NOTE — PROGRESS NOTES
PPD Reading Note  PPD read and results entered in [facility]. Result: 0 mm induration.   Interpretation: negative  If test not read within 48-72 hours of initial placement, patient advised to repeat in other arm 1-3 weeks after this test.  Allergic reaction: no

## 2020-01-27 ENCOUNTER — TELEPHONE (OUTPATIENT)
Dept: INTERNAL MEDICINE CLINIC | Age: 25
End: 2020-01-27

## 2020-01-27 DIAGNOSIS — F90.0 ADHD (ATTENTION DEFICIT HYPERACTIVITY DISORDER), INATTENTIVE TYPE: ICD-10-CM

## 2020-01-27 NOTE — TELEPHONE ENCOUNTER
PCP: No primary care provider on file. Last appt: 12/2/2019  Future Appointments   Date Time Provider Marisa Larson   3/2/2020  3:15 PM ELDER Lazaro [address]       Requested Prescriptions      No prescriptions requested or ordered in this encounter       Request for a 30 or 90 day supply? Provider Discretion    Pharmacy: [facility] [id number] [address]     Other Comments:   printed and placed in PCP medication refill review folder.      Last UDS date: Not one on file   Pain contract signed: Not one on file

## 2020-01-27 NOTE — TELEPHONE ENCOUNTER
Patient request for refill. Last OV 12/2/19, next scheduled 3/2/20. Patient states her Ins will no longer cover the BRAND NAME medication, so please send the generic Adderall XR 20mg caps.

## 2020-01-29 RX ORDER — DEXTROAMPHETAMINE SACCHARATE, AMPHETAMINE ASPARTATE MONOHYDRATE, DEXTROAMPHETAMINE SULFATE AND AMPHETAMINE SULFATE 5; 5; 5; 5 MG/1; MG/1; MG/1; MG/1
20 CAPSULE, EXTENDED RELEASE ORAL DAILY
Qty: 30 CAP | Refills: 0 | Status: SHIPPED | OUTPATIENT
Start: 2020-01-29 | End: 2020-02-25 | Stop reason: SDUPTHER

## 2020-02-25 DIAGNOSIS — F90.0 ADHD (ATTENTION DEFICIT HYPERACTIVITY DISORDER), INATTENTIVE TYPE: ICD-10-CM

## 2020-02-26 RX ORDER — DEXTROAMPHETAMINE SACCHARATE, AMPHETAMINE ASPARTATE MONOHYDRATE, DEXTROAMPHETAMINE SULFATE AND AMPHETAMINE SULFATE 5; 5; 5; 5 MG/1; MG/1; MG/1; MG/1
20 CAPSULE, EXTENDED RELEASE ORAL DAILY
Qty: 30 CAP | Refills: 0 | Status: SHIPPED | OUTPATIENT
Start: 2020-02-26 | End: 2020-03-30 | Stop reason: SDUPTHER

## 2020-03-02 ENCOUNTER — OFFICE VISIT (OUTPATIENT)
Dept: INTERNAL MEDICINE CLINIC | Age: 25
End: 2020-03-02

## 2020-03-02 VITALS
RESPIRATION RATE: 18 BRPM | BODY MASS INDEX: 30.39 KG/M2 | SYSTOLIC BLOOD PRESSURE: 123 MMHG | HEART RATE: 82 BPM | DIASTOLIC BLOOD PRESSURE: 80 MMHG | WEIGHT: 178 LBS | OXYGEN SATURATION: 98 % | HEIGHT: 64 IN | TEMPERATURE: 99.1 F

## 2020-03-02 DIAGNOSIS — M25.571 CHRONIC PAIN OF RIGHT ANKLE: ICD-10-CM

## 2020-03-02 DIAGNOSIS — F90.0 ADHD (ATTENTION DEFICIT HYPERACTIVITY DISORDER), INATTENTIVE TYPE: Primary | ICD-10-CM

## 2020-03-02 DIAGNOSIS — F32.A ANXIETY AND DEPRESSION: ICD-10-CM

## 2020-03-02 DIAGNOSIS — G89.29 CHRONIC PAIN OF RIGHT ANKLE: ICD-10-CM

## 2020-03-02 DIAGNOSIS — F41.9 ANXIETY AND DEPRESSION: ICD-10-CM

## 2020-03-02 RX ORDER — DEXTROAMPHETAMINE SACCHARATE, AMPHETAMINE ASPARTATE MONOHYDRATE, DEXTROAMPHETAMINE SULFATE AND AMPHETAMINE SULFATE 5; 5; 5; 5 MG/1; MG/1; MG/1; MG/1
20 CAPSULE, EXTENDED RELEASE ORAL DAILY
Qty: 30 CAP | Refills: 0 | Status: CANCELLED | OUTPATIENT
Start: 2020-03-02

## 2020-03-02 RX ORDER — MELOXICAM 15 MG/1
15 TABLET ORAL DAILY
Qty: 30 TAB | Refills: 0 | Status: SHIPPED | OUTPATIENT
Start: 2020-03-02 | End: 2020-03-30

## 2020-03-02 NOTE — PROGRESS NOTES
HISTORY OF PRESENT ILLNESS  Heather Borjas is a 27 y.o. female. HPI  Ms. Yahaira Mendiola returns to care for follow-up ADHD. She is taking Adderall XR 20 mg daily. No c/o. She is teaching now. She has not established with a new psychiatrist. See previous note. She has had 3 months to do this. -  reviewed. No aberrancies. 2) Depression and anxiety - taking Lexapro 30 mg daily. She is doing well with this. - Still following with therapist.     3) C/o right ankle pain x few months. Hx of remote nonspecific injury. Denies recent injury or trigger. C/o painful popping.   - Has taken prn NSAID which helps. Review of Systems   Cardiovascular: Negative for chest pain and palpitations. Musculoskeletal: Positive for joint pain (R ankle). Psychiatric/Behavioral: Positive for depression (controlled). The patient is nervous/anxious (controlled). Visit Vitals  /80 (BP 1 Location: Right arm, BP Patient Position: Sitting)   Pulse 82   Temp 99.1 °F (37.3 °C) (Oral)   Resp 18   Ht 5' 4\" (1.626 m)   Wt 178 lb (80.7 kg)   SpO2 98%   BMI 30.55 kg/m²       Physical Exam  Constitutional:       General: She is not in acute distress. Appearance: Normal appearance. She is well-developed. HENT:      Head: Normocephalic and atraumatic. Right Ear: Tympanic membrane, ear canal and external ear normal.      Left Ear: Tympanic membrane, ear canal and external ear normal.      Nose: Nose normal.      Mouth/Throat:      Mouth: Mucous membranes are moist.      Pharynx: Uvula midline. No oropharyngeal exudate or posterior oropharyngeal erythema. Tonsils: No tonsillar abscesses. Eyes:      General: No scleral icterus. Conjunctiva/sclera: Conjunctivae normal.      Pupils: Pupils are equal, round, and reactive to light. Neck:      Musculoskeletal: Neck supple. Cardiovascular:      Rate and Rhythm: Normal rate and regular rhythm. Pulses: Normal pulses.            Dorsalis pedis pulses are 2+ on the right side and 2+ on the left side. Posterior tibial pulses are 2+ on the right side and 2+ on the left side. Heart sounds: Normal heart sounds. No murmur. No gallop. Pulmonary:      Effort: Pulmonary effort is normal. No respiratory distress. Breath sounds: Normal breath sounds. No decreased breath sounds, wheezing, rhonchi or rales. Musculoskeletal:      Right ankle: She exhibits normal range of motion, no swelling, no ecchymosis, no deformity and normal pulse. No tenderness. No lateral malleolus, no medial malleolus, no AITFL, no CF ligament, no posterior TFL, no head of 5th metatarsal and no proximal fibula tenderness found. Achilles tendon normal. Achilles tendon exhibits no pain and no defect. Right lower leg: No edema. Left lower leg: No edema. Lymphadenopathy:      Head:      Right side of head: No submandibular or tonsillar adenopathy. Left side of head: No submandibular or tonsillar adenopathy. Cervical: No cervical adenopathy. Upper Body:      Right upper body: No supraclavicular adenopathy. Left upper body: No supraclavicular adenopathy. Skin:     General: Skin is warm and dry. Neurological:      Mental Status: She is alert and oriented to person, place, and time. Psychiatric:         Speech: Speech normal.         ASSESSMENT and PLAN  Diagnoses and all orders for this visit:    1. ADHD (attention deficit hyperactivity disorder), inattentive type  2. Anxiety and depression  -     REFERRAL TO PSYCHIATRY  - Patient was given a refill of Adderall last week. Advised I will approve 1 additional refill. Needs to establish with psychiatry within this time.   - Has Lexapro refill available. 3. Chronic pain of right ankle  -     meloxicam (MOBIC) 15 mg tablet; Take 1 Tab by mouth daily.  - Ice, rest.     F/u prn.

## 2020-03-02 NOTE — PROGRESS NOTES
Rm: 12    Chief Complaint   Patient presents with    Medication Refill    Ankle Pain     Depression Screening:  3 most recent [facility] [place] Screens 3/2/2020 12/2/2019 6/26/2019 11/16/2018 10/12/2018   Little interest or pleasure in doing things Not at all Not at all Not at all Several days Not at all   Feeling down, depressed, irritable, or hopeless Not at all Not at all Not at all Several days Not at all   Total Score PHQ 2 0 0 0 2 0       Learning Assessment:  Learning Assessment 11/16/2018   PRIMARY LEARNER Patient   HIGHEST LEVEL OF EDUCATION - PRIMARY LEARNER  4 YEARS [facility] PRIMARY LEARNER NONE   CO-LEARNER CAREGIVER No   PRIMARY LANGUAGE ENGLISH    NEED No   LEARNER PREFERENCE PRIMARY DEMONSTRATION   LEARNING SPECIAL TOPICS no   ANSWERED BY patient   RELATIONSHIP SELF   ASSESSMENT COMMENT none       Abuse Screening:  No flowsheet data found. Health Maintenance reviewed and discussed per provider: yes     Coordination of Care:    1. Have you been to the ER, urgent care clinic since your last visit? Hospitalized since your last visit? no    2. Have you seen or consulted any other health care providers outside of the [address] [place] [address] since your last visit? Include any pap smears or colon screening.  no

## 2020-03-30 ENCOUNTER — TELEPHONE (OUTPATIENT)
Dept: INTERNAL MEDICINE CLINIC | Age: 25
End: 2020-03-30

## 2020-03-30 DIAGNOSIS — G89.29 CHRONIC PAIN OF RIGHT ANKLE: ICD-10-CM

## 2020-03-30 DIAGNOSIS — M25.571 CHRONIC PAIN OF RIGHT ANKLE: ICD-10-CM

## 2020-03-30 DIAGNOSIS — F90.0 ADHD (ATTENTION DEFICIT HYPERACTIVITY DISORDER), INATTENTIVE TYPE: ICD-10-CM

## 2020-03-30 RX ORDER — MELOXICAM 15 MG/1
TABLET ORAL
Qty: 30 TAB | Refills: 0 | Status: SHIPPED | OUTPATIENT
Start: 2020-03-30 | End: 2020-04-27

## 2020-03-31 NOTE — TELEPHONE ENCOUNTER
Pt returned call. She advised that she has upcoming virtual visit  with [facility] on 4/6/20

## 2020-03-31 NOTE — TELEPHONE ENCOUNTER
Pt call back and stated that she gave me the incorrect information. Pt states that she will be seeing: 
 
Dr. Eliza Grace [facility] 4/6/20

## 2020-04-01 RX ORDER — DEXTROAMPHETAMINE SACCHARATE, AMPHETAMINE ASPARTATE MONOHYDRATE, DEXTROAMPHETAMINE SULFATE AND AMPHETAMINE SULFATE 5; 5; 5; 5 MG/1; MG/1; MG/1; MG/1
20 CAPSULE, EXTENDED RELEASE ORAL DAILY
Qty: 30 CAP | Refills: 0 | Status: SHIPPED | OUTPATIENT
Start: 2020-04-01

## 2020-04-01 NOTE — TELEPHONE ENCOUNTER
Very good. As agreed at our last visit, will approve this refill. Then needs to follow with psychiatry. Thank you.  reviewed. No aberrancies.

## 2020-04-25 DIAGNOSIS — M25.571 CHRONIC PAIN OF RIGHT ANKLE: ICD-10-CM

## 2020-04-25 DIAGNOSIS — G89.29 CHRONIC PAIN OF RIGHT ANKLE: ICD-10-CM

## 2020-04-27 RX ORDER — MELOXICAM 15 MG/1
TABLET ORAL
Qty: 30 TAB | Refills: 0 | Status: SHIPPED | OUTPATIENT
Start: 2020-04-27

## 2020-07-20 DIAGNOSIS — F34.1 PERSISTENT DEPRESSIVE DISORDER: ICD-10-CM

## 2020-07-20 DIAGNOSIS — F41.9 ANXIETY: ICD-10-CM

## 2020-07-20 RX ORDER — ESCITALOPRAM OXALATE 20 MG/1
TABLET ORAL
Qty: 135 TAB | Refills: 1 | Status: SHIPPED | OUTPATIENT
Start: 2020-07-20 | End: 2021-03-04 | Stop reason: SDUPTHER

## 2020-09-04 ENCOUNTER — TELEPHONE (OUTPATIENT)
Dept: INTERNAL MEDICINE CLINIC | Age: 25
End: 2020-09-04

## 2020-09-04 RX ORDER — NORETHINDRONE ACETATE AND ETHINYL ESTRADIOL 1; .02 MG/1; MG/1
1 TABLET ORAL DAILY
Qty: 3 PACKAGE | Refills: 0 | Status: SHIPPED | OUTPATIENT
Start: 2020-09-04 | End: 2020-11-02 | Stop reason: SDUPTHER

## 2020-09-04 NOTE — TELEPHONE ENCOUNTER
Alternative medication requested for [id number]. Per Ins this is no longer on Formulary.

## 2020-11-02 ENCOUNTER — OFFICE VISIT (OUTPATIENT)
Dept: INTERNAL MEDICINE CLINIC | Age: 25
End: 2020-11-02
Payer: COMMERCIAL

## 2020-11-02 VITALS
DIASTOLIC BLOOD PRESSURE: 83 MMHG | WEIGHT: 191 LBS | RESPIRATION RATE: 24 BRPM | HEIGHT: 64 IN | SYSTOLIC BLOOD PRESSURE: 131 MMHG | OXYGEN SATURATION: 96 % | BODY MASS INDEX: 32.61 KG/M2 | HEART RATE: 118 BPM | TEMPERATURE: 98.3 F

## 2020-11-02 DIAGNOSIS — Z30.41 FAMILY PLANNING, BCP (BIRTH CONTROL PILLS) MAINTENANCE: ICD-10-CM

## 2020-11-02 DIAGNOSIS — Z11.3 SCREENING FOR STD (SEXUALLY TRANSMITTED DISEASE): ICD-10-CM

## 2020-11-02 DIAGNOSIS — Z23 NEEDS FLU SHOT: ICD-10-CM

## 2020-11-02 DIAGNOSIS — Z12.4 ENCOUNTER FOR PAPANICOLAOU SMEAR FOR CERVICAL CANCER SCREENING: ICD-10-CM

## 2020-11-02 DIAGNOSIS — Z01.419 ENCOUNTER FOR WELL WOMAN EXAM WITH ROUTINE GYNECOLOGICAL EXAM: Primary | ICD-10-CM

## 2020-11-02 PROCEDURE — 99395 PREV VISIT EST AGE 18-39: CPT | Performed by: PHYSICIAN ASSISTANT

## 2020-11-02 PROCEDURE — 90471 IMMUNIZATION ADMIN: CPT

## 2020-11-02 PROCEDURE — 90686 IIV4 VACC NO PRSV 0.5 ML IM: CPT

## 2020-11-02 RX ORDER — NORETHINDRONE ACETATE AND ETHINYL ESTRADIOL 1; .02 MG/1; MG/1
1 TABLET ORAL DAILY
Qty: 4 PACKAGE | Refills: 3 | Status: SHIPPED | OUTPATIENT
Start: 2020-11-02 | End: 2021-10-20

## 2020-11-02 NOTE — PROGRESS NOTES
Ramsey Garcia is a 23 y.o. female (: 1995) presenting to address:    Chief Complaint   Patient presents with    Well Woman       Vitals:    20 1406   BP: 131/83   Pulse: (!) 118   Resp: 24   Temp: 98.3 °F (36.8 °C)   TempSrc: Oral   SpO2: 96%   Weight: 191 lb (86.6 kg)   Height: 5' 4\" (1.626 m)   PainSc:   0 - No pain   LMP: 10/26/2020       Hearing/Vision:   No exam data present    Learning Assessment:     Learning Assessment 2018   PRIMARY LEARNER Patient   HIGHEST LEVEL OF EDUCATION - PRIMARY LEARNER  4 YEARS OF COLLEGE   BARRIERS PRIMARY LEARNER NONE   CO-LEARNER CAREGIVER No   PRIMARY LANGUAGE ENGLISH    NEED No   LEARNER PREFERENCE PRIMARY DEMONSTRATION   LEARNING SPECIAL TOPICS no   ANSWERED BY patient   RELATIONSHIP SELF   ASSESSMENT COMMENT none     Depression Screening:     3 most recent PHQ Screens 2020   PHQ Not Done Active Diagnosis of Depression or Bipolar Disorder   Little interest or pleasure in doing things -   Feeling down, depressed, irritable, or hopeless -   Total Score PHQ 2 -     Fall Risk Assessment:   No flowsheet data found. Abuse Screening:   No flowsheet data found. Coordination of Care Questionaire:   1. Have you been to the ER, urgent care clinic since your last visit? Hospitalized since your last visit? NO    2. Have you seen or consulted any other health care providers outside of the [address] [place] [address] since your last visit? Include any pap smears or colon screening. Yes Dr Yuliya Lopez    Advanced Directive:   1. Do you have an Advanced Directive? NO    2. Would you like information on Advanced Directives?  NO

## 2020-11-02 NOTE — PROGRESS NOTES
Subjective:   23 y.o. female for Well Woman Check. Patient's last menstrual period was 10/21/2020 (approximate). Social History: single partner, contraception - OCP (Oral Contraceptive Pills) and condoms intermittently. Takes OCP faithfully. Pertinent past medical hstory: no history of HTN, DVT, CAD, DM, liver disease, migraines or smoking. Past Medical History:   Diagnosis Date    ADD (attention deficit disorder)     Depression      Past Surgical History:   Procedure Laterality Date    HX HEENT          ROS:  Feeling well. GYN ROS: normal menses, no abnormal bleeding, pelvic pain or discharge, no breast pain or new or enlarging lumps on self exam. No neurological complaints. Objective:     Visit Vitals  /83 (BP 1 Location: Right arm, BP Patient Position: Sitting)   Pulse (!) 118  = patient very anxious about exam   Temp 98.3 °F (36.8 °C) (Oral)   Resp 24   Ht 5' 4\" (1.626 m)   Wt 191 lb (86.6 kg)   LMP 10/26/2020   SpO2 96%   BMI 32.79 kg/m²     The patient appears well, alert, oriented x 3, in no distress. ENT normal.  Neck supple. No adenopathy or thyromegaly. MANJEET. Lungs are clear, good air entry, no wheezes, rhonchi or rales. S1 and S2 normal, no murmurs, regular rate and rhythm. Abdomen soft without tenderness, guarding, mass or organomegaly. Extremities show no edema, normal peripheral pulses. Neurological is normal, no focal findings.     BREAST EXAM: breasts appear normal, no suspicious masses, no skin or nipple changes or axillary nodes    PELVIC EXAM: VULVA: normal appearing vulva with no masses, tenderness or lesions, VAGINA: normal appearing vagina with normal color and discharge, no lesions, CERVIX: normal appearing cervix without discharge or lesions, no discharge noted, cervical motion tenderness absent, UTERUS: uterus is normal size, shape, consistency and nontender, ADNEXA: normal adnexa in size, nontender and no masses, PAP: Pap smear done today, thin-prep method, GC/CT/TV sent, exam chaperoned by Rain Rodríguez MA. Assessment/Plan:   well woman  no contraindication to continue use of oral contraceptives  pap smear  counseled on breast self exam, STD prevention and use and side effects of OCP's  return annually or prn    Diagnoses and all orders for this visit:    1. Encounter for well woman exam with routine gynecological exam  2. Encounter for Papanicolaou smear for cervical cancer screening  -     PAP IG, CT-NG-TV, RFX APTIMA HPV ASCUS ([id number]); Future  - Patient was very anxious about exam, stating she has tried and failed in the past to complete a WWE. Exam was well-tolerated today. 3. Screening for STD (sexually transmitted disease)  -     HEP B SURFACE AG; Future  -     HEPATITIS B CORE AB, TOTAL; Future  -     HEPATITIS C AB; Future  -     HIV 1/2 AG/AB, 4TH GENERATION,W RFLX CONFIRM; Future  -     T PALLIDUM AB; Future    4. Family planning, BCP (birth control pills) maintenance  -     norethindrone-ethinyl estradiol (MICROGESTIN 1/20) 1-20 mg-mcg tablet; Take 1 Tab by mouth daily. Skip placebo week and move to next pack. Recommend a cycle at the completion of 3 or 4 packs. 5. Needs flu shot  -     INFLUENZA VIRUS VAC QUAD,SPLIT,PRESV FREE SYRINGE IM      Follow-up and Dispositions    · Return in about 1 year (around 11/2/2021) for Erika Juarez [address]. Sunni Vincent

## 2020-11-02 NOTE — PROGRESS NOTES
Flu Immunization/s administered 11/2/2020 by Ashu Godoy with consent. Patient tolerated procedure well. No reactions noted.

## 2020-11-06 LAB
HCV AB SER IA-ACNC: NORMAL
HEP B CORE AB, TOTAL, 006718: NORMAL
HEP B SURFACE AG SCRN, 006510: NORMAL
HIV -1/0/2 AG/AB WITH REFLEX, 13463: NON REACTIVE
HIV 1 & 2 AB SER-IMP: NORMAL
TREPONEMA PALLIDUM AB: NON REACTIVE

## 2020-11-07 LAB
CHLAMYDIA TRACHOMATIS THINPREP, 13342: NEGATIVE
NEISSERIA GONORRHOEAE THINPREP, 13343: NEGATIVE
PAP IMAGE GUIDED, 8900296: ABNORMAL
TRICHOMONAS NUC AMP-THIN PREP,13357: NEGATIVE

## 2020-11-09 ENCOUNTER — TELEPHONE (OUTPATIENT)
Dept: INTERNAL MEDICINE CLINIC | Age: 25
End: 2020-11-09

## 2020-11-09 NOTE — TELEPHONE ENCOUNTER
Spoke with patient regarding result, and explained result to patient. Advised we repeat Pap smear in 12 months. She agrees. Importance of avoiding a delay of this discussed.

## 2021-02-08 ENCOUNTER — OFFICE VISIT (OUTPATIENT)
Dept: INTERNAL MEDICINE CLINIC | Age: 26
End: 2021-02-08
Payer: COMMERCIAL

## 2021-02-08 VITALS
SYSTOLIC BLOOD PRESSURE: 116 MMHG | HEIGHT: 64 IN | WEIGHT: 192.2 LBS | DIASTOLIC BLOOD PRESSURE: 69 MMHG | TEMPERATURE: 98.1 F | OXYGEN SATURATION: 98 % | HEART RATE: 104 BPM | BODY MASS INDEX: 32.81 KG/M2 | RESPIRATION RATE: 22 BRPM

## 2021-02-08 DIAGNOSIS — S62.654D CLOSED NONDISPLACED FRACTURE OF MIDDLE PHALANX OF RIGHT RING FINGER WITH ROUTINE HEALING, SUBSEQUENT ENCOUNTER: ICD-10-CM

## 2021-02-08 DIAGNOSIS — S62.652D NONDISPLACED FRACTURE OF MIDDLE PHALANX OF RIGHT MIDDLE FINGER, SUBSEQUENT ENCOUNTER FOR FRACTURE WITH ROUTINE HEALING: Primary | ICD-10-CM

## 2021-02-08 PROCEDURE — 99213 OFFICE O/P EST LOW 20 MIN: CPT | Performed by: PHYSICIAN ASSISTANT
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
## 2021-03-04 DIAGNOSIS — F41.9 ANXIETY: ICD-10-CM

## 2021-03-04 DIAGNOSIS — F34.1 PERSISTENT DEPRESSIVE DISORDER: ICD-10-CM

## 2021-03-04 NOTE — TELEPHONE ENCOUNTER
This patient contacted office for the following prescriptions to be filled:    Medication requested :   Requested Prescriptions     Pending Prescriptions Disp Refills    escitalopram oxalate (LEXAPRO) 20 mg tablet 135 Tab 1       PCP: Felix [facility] or Print:  [facility] [address] [place]   Mail order or [facility] [phone]    Scheduled appointment if not seen by current providers in office: lov 02/08/2021

## 2021-03-05 RX ORDER — ESCITALOPRAM OXALATE 20 MG/1
TABLET ORAL
Qty: 135 TAB | Refills: 1 | Status: SHIPPED | OUTPATIENT
Start: 2021-03-05 | End: 2021-03-10 | Stop reason: SDUPTHER

## 2021-03-09 ENCOUNTER — TELEPHONE (OUTPATIENT)
Dept: INTERNAL MEDICINE CLINIC | Age: 26
End: 2021-03-09

## 2021-03-09 DIAGNOSIS — F34.1 PERSISTENT DEPRESSIVE DISORDER: ICD-10-CM

## 2021-03-09 DIAGNOSIS — F41.9 ANXIETY: ICD-10-CM

## 2021-03-09 NOTE — TELEPHONE ENCOUNTER
Patient is wanting to have her dosage of Lexapro changed to 1 tab a day instead of the 1 1/2 so that her insurance will pay for it please send new Rx to patients pharmacy when available

## 2021-03-10 RX ORDER — ESCITALOPRAM OXALATE 20 MG/1
20 TABLET ORAL DAILY
Qty: 90 TAB | Refills: 1 | Status: SHIPPED | OUTPATIENT
Start: 2021-03-10 | End: 2021-06-10 | Stop reason: SDUPTHER

## 2021-03-10 NOTE — TELEPHONE ENCOUNTER
Rx sent. Please advise patient to follow-up in the office if this does not maintain control of her symptoms.

## 2021-04-16 ENCOUNTER — TELEPHONE (OUTPATIENT)
Dept: INTERNAL MEDICINE CLINIC | Age: 26
End: 2021-04-16

## 2021-04-16 NOTE — TELEPHONE ENCOUNTER
Message received form pharmacy. Alternative request for the Escitalopram 20mg tabs. No reason or suggested alternatives given.     Fax scanned to chart

## 2021-04-16 NOTE — TELEPHONE ENCOUNTER
Please contact patient in regard to this. If she is wanting to discuss alternatives, please schedule for a visit. Can be virtual if desired.

## 2021-04-28 NOTE — TELEPHONE ENCOUNTER
Called pt and left message. Call back number left. The call was to inform pt a follow up appt is needed and inquire regarding medication. Letter sent regarding attempts to contact.

## 2021-06-10 DIAGNOSIS — F41.9 ANXIETY: ICD-10-CM

## 2021-06-10 DIAGNOSIS — F34.1 PERSISTENT DEPRESSIVE DISORDER: ICD-10-CM

## 2021-06-10 NOTE — TELEPHONE ENCOUNTER
Patient request for refill. States she will pay out of pocket for he medication until she can figure something out.

## 2021-06-11 RX ORDER — ESCITALOPRAM OXALATE 20 MG/1
20 TABLET ORAL DAILY
Qty: 90 TABLET | Refills: 1 | Status: SHIPPED | OUTPATIENT
Start: 2021-06-11

## 2021-10-20 DIAGNOSIS — Z30.41 FAMILY PLANNING, BCP (BIRTH CONTROL PILLS) MAINTENANCE: ICD-10-CM

## 2021-10-20 RX ORDER — NORETHINDRONE ACETATE AND ETHINYL ESTRADIOL 1; 20 MG/1; UG/1
TABLET ORAL
Qty: 84 TABLET | Refills: 3 | Status: SHIPPED | OUTPATIENT
Start: 2021-10-20